# Patient Record
Sex: MALE | Race: WHITE | NOT HISPANIC OR LATINO | URBAN - METROPOLITAN AREA
[De-identification: names, ages, dates, MRNs, and addresses within clinical notes are randomized per-mention and may not be internally consistent; named-entity substitution may affect disease eponyms.]

---

## 2019-02-12 ENCOUNTER — HISTORICAL (OUTPATIENT)
Dept: LAB | Facility: HOSPITAL | Age: 29
End: 2019-02-12

## 2019-02-12 LAB
ABS NEUT (OLG): 6.49
ALBUMIN SERPL-MCNC: 4.3 GM/DL (ref 3.4–5)
ALBUMIN/GLOB SERPL: 1.1 RATIO (ref 1.1–2)
ALP SERPL-CCNC: 151 UNIT/L (ref 46–116)
ALT SERPL-CCNC: 52 UNIT/L (ref 12–78)
AMPHET UR QL SCN: NORMAL
AST SERPL-CCNC: 19 UNIT/L (ref 10–37)
BARBITURATE SCN PRESENT UR: NORMAL
BASOPHILS # BLD AUTO: 0.02 X10(3)/MCL
BASOPHILS NFR BLD AUTO: 0.2 %
BENZODIAZ UR QL SCN: NORMAL
BILIRUB SERPL-MCNC: 0.9 MG/DL (ref 0.2–1)
BILIRUBIN DIRECT+TOT PNL SERPL-MCNC: 0.18 MG/DL (ref 0–0.2)
BILIRUBIN DIRECT+TOT PNL SERPL-MCNC: 0.72 MG/DL
BUN SERPL-MCNC: 12 MG/DL (ref 7–18)
CALCIUM SERPL-MCNC: 9.5 MG/DL (ref 8.5–10.1)
CANNABINOIDS UR QL SCN: NORMAL
CHLORIDE SERPL-SCNC: 102 MMOL/L (ref 98–107)
CHOLEST SERPL-MCNC: 264 MG/DL (ref 50–200)
CHOLEST/HDLC SERPL: 7 {RATIO} (ref 0–5)
CO2 SERPL-SCNC: 30.5 MMOL/L (ref 21–32)
COCAINE UR QL SCN: NORMAL
CREAT SERPL-MCNC: 1.14 MG/DL (ref 0.7–1.3)
DEPRECATED CALCIDIOL+CALCIFEROL SERPL-MC: 24.05 NG/ML (ref 30–80)
EOSINOPHIL # BLD AUTO: 0.11 X10(3)/MCL
EOSINOPHIL NFR BLD AUTO: 1 %
ERYTHROCYTE [DISTWIDTH] IN BLOOD BY AUTOMATED COUNT: 13 %
EST. AVERAGE GLUCOSE BLD GHB EST-MCNC: 117 MG/DL
FT4I SERPL CALC-MCNC: 3 (ref 3–4)
GLOBULIN SER-MCNC: 3.8 GM/DL (ref 2.4–3.5)
GLUCOSE SERPL-MCNC: 99 MG/DL (ref 74–106)
HBA1C MFR BLD: 5.7 % (ref 4.5–6.2)
HCT VFR BLD AUTO: 49.9 % (ref 39–49)
HDLC SERPL-MCNC: 40 MG/DL (ref 35–60)
HGB BLD-MCNC: 16.8 GM/DL (ref 12.6–16.6)
IMM GRANULOCYTES # BLD AUTO: 0.03 10*3/UL (ref 0–0.1)
IMM GRANULOCYTES NFR BLD AUTO: 0.3 % (ref 0–1)
LDLC SERPL CALC-MCNC: 205 MG/DL (ref 50–140)
LYMPHOCYTES # BLD AUTO: 3.56 X10(3)/MCL
LYMPHOCYTES NFR BLD AUTO: 31.1 %
MCH RBC QN AUTO: 29 PG (ref 27–33)
MCHC RBC AUTO-ENTMCNC: 33.7 GM/DL (ref 32–35)
MCV RBC AUTO: 86 FL (ref 84–97)
METHADONE UR QL SCN: NORMAL
MONOCYTES # BLD AUTO: 1.24 X10(3)/MCL
MONOCYTES NFR BLD AUTO: 10.8 %
NEUTROPHILS # BLD AUTO: 6.49 X10(3)/MCL
NEUTROPHILS NFR BLD AUTO: 56.6 %
OPIATES UR QL SCN: NORMAL
PCP UR QL: NORMAL
PH UR STRIP.AUTO: 6 [PH] (ref 5–8)
PLATELET # BLD AUTO: 363 X10(3)/MCL (ref 151–368)
PMV BLD AUTO: 10 FL
POTASSIUM SERPL-SCNC: 3.9 MMOL/L (ref 3.5–5.1)
PROT SERPL-MCNC: 8.1 GM/DL (ref 6.4–8.2)
RBC # BLD AUTO: 5.8 X10(6)/MCL (ref 4.3–5.6)
SODIUM SERPL-SCNC: 141 MMOL/L (ref 136–145)
T3RU NFR SERPL: 37 % (ref 31–39)
T4 SERPL-MCNC: 8.8 MCG/DL (ref 4.7–13.3)
TRIGL SERPL-MCNC: 96 MG/DL (ref 30–150)
TSH SERPL-ACNC: 0.73 MIU/ML (ref 0.35–3.75)
VLDLC SERPL CALC-MCNC: 19 MG/DL
WBC # SPEC AUTO: 11.45 X10(3)/MCL (ref 3.4–9.2)

## 2024-01-07 ENCOUNTER — HOSPITAL ENCOUNTER (EMERGENCY)
Facility: HOSPITAL | Age: 34
Discharge: ANOTHER HOSPITAL | End: 2024-01-07
Attending: EMERGENCY MEDICINE
Payer: MEDICAID

## 2024-01-07 ENCOUNTER — HOSPITAL ENCOUNTER (INPATIENT)
Facility: HOSPITAL | Age: 34
LOS: 5 days | Discharge: HOME | DRG: 885 | End: 2024-01-12
Attending: PSYCHIATRY & NEUROLOGY | Admitting: PSYCHIATRY & NEUROLOGY
Payer: MEDICAID

## 2024-01-07 VITALS
SYSTOLIC BLOOD PRESSURE: 155 MMHG | TEMPERATURE: 98 F | BODY MASS INDEX: 28.23 KG/M2 | OXYGEN SATURATION: 100 % | HEART RATE: 85 BPM | HEIGHT: 74 IN | RESPIRATION RATE: 17 BRPM | DIASTOLIC BLOOD PRESSURE: 90 MMHG | WEIGHT: 220 LBS

## 2024-01-07 DIAGNOSIS — F29 PSYCHOSIS: ICD-10-CM

## 2024-01-07 DIAGNOSIS — Z00.8 MEDICAL CLEARANCE FOR PSYCHIATRIC ADMISSION: Primary | ICD-10-CM

## 2024-01-07 DIAGNOSIS — F23 ACUTE PSYCHOSIS: ICD-10-CM

## 2024-01-07 LAB
ALBUMIN SERPL-MCNC: 3.5 G/DL (ref 3.5–5)
ALBUMIN/GLOB SERPL: 1.1 RATIO (ref 1.1–2)
ALP SERPL-CCNC: 101 UNIT/L (ref 40–150)
ALT SERPL-CCNC: 15 UNIT/L (ref 0–55)
AMPHET UR QL SCN: POSITIVE
APAP SERPL-MCNC: <17.4 UG/ML (ref 17.4–30)
APPEARANCE UR: CLEAR
AST SERPL-CCNC: 15 UNIT/L (ref 5–34)
BARBITURATE SCN PRESENT UR: NEGATIVE
BASOPHILS # BLD AUTO: 0.05 X10(3)/MCL
BASOPHILS NFR BLD AUTO: 0.7 %
BENZODIAZ UR QL SCN: NEGATIVE
BILIRUB SERPL-MCNC: 0.2 MG/DL
BILIRUB UR QL STRIP.AUTO: NEGATIVE
BUN SERPL-MCNC: 15 MG/DL (ref 8.9–20.6)
CALCIUM SERPL-MCNC: 8.9 MG/DL (ref 8.4–10.2)
CANNABINOIDS UR QL SCN: POSITIVE
CHLORIDE SERPL-SCNC: 109 MMOL/L (ref 98–107)
CO2 SERPL-SCNC: 22 MMOL/L (ref 22–29)
COCAINE UR QL SCN: NEGATIVE
COLOR UR AUTO: YELLOW
CREAT SERPL-MCNC: 0.84 MG/DL (ref 0.73–1.18)
EOSINOPHIL # BLD AUTO: 0.19 X10(3)/MCL (ref 0–0.9)
EOSINOPHIL NFR BLD AUTO: 2.5 %
ERYTHROCYTE [DISTWIDTH] IN BLOOD BY AUTOMATED COUNT: 13.6 % (ref 11.5–17)
ETHANOL SERPL-MCNC: <10 MG/DL
FENTANYL UR QL SCN: NEGATIVE
GFR SERPLBLD CREATININE-BSD FMLA CKD-EPI: >60 MLS/MIN/1.73/M2
GLOBULIN SER-MCNC: 3.1 GM/DL (ref 2.4–3.5)
GLUCOSE SERPL-MCNC: 102 MG/DL (ref 74–100)
GLUCOSE UR QL STRIP.AUTO: NEGATIVE
HCT VFR BLD AUTO: 39.4 % (ref 42–52)
HGB BLD-MCNC: 12.7 G/DL (ref 14–18)
IMM GRANULOCYTES # BLD AUTO: 0.03 X10(3)/MCL (ref 0–0.04)
IMM GRANULOCYTES NFR BLD AUTO: 0.4 %
KETONES UR QL STRIP.AUTO: NEGATIVE
LEUKOCYTE ESTERASE UR QL STRIP.AUTO: NEGATIVE
LYMPHOCYTES # BLD AUTO: 2.54 X10(3)/MCL (ref 0.6–4.6)
LYMPHOCYTES NFR BLD AUTO: 33.9 %
MCH RBC QN AUTO: 28.7 PG (ref 27–31)
MCHC RBC AUTO-ENTMCNC: 32.2 G/DL (ref 33–36)
MCV RBC AUTO: 89.1 FL (ref 80–94)
MDMA UR QL SCN: NEGATIVE
MONOCYTES # BLD AUTO: 0.55 X10(3)/MCL (ref 0.1–1.3)
MONOCYTES NFR BLD AUTO: 7.3 %
NEUTROPHILS # BLD AUTO: 4.13 X10(3)/MCL (ref 2.1–9.2)
NEUTROPHILS NFR BLD AUTO: 55.2 %
NITRITE UR QL STRIP.AUTO: NEGATIVE
NRBC BLD AUTO-RTO: 0 %
OPIATES UR QL SCN: NEGATIVE
PCP UR QL: NEGATIVE
PH UR STRIP.AUTO: 7 [PH]
PH UR: 7 [PH] (ref 3–11)
PLATELET # BLD AUTO: 267 X10(3)/MCL (ref 130–400)
PMV BLD AUTO: 9.5 FL (ref 7.4–10.4)
POTASSIUM SERPL-SCNC: 4.1 MMOL/L (ref 3.5–5.1)
PROT SERPL-MCNC: 6.6 GM/DL (ref 6.4–8.3)
PROT UR QL STRIP.AUTO: NEGATIVE
RBC # BLD AUTO: 4.42 X10(6)/MCL (ref 4.7–6.1)
RBC UR QL AUTO: NEGATIVE
SARS-COV-2 RDRP RESP QL NAA+PROBE: NEGATIVE
SODIUM SERPL-SCNC: 140 MMOL/L (ref 136–145)
SP GR UR STRIP.AUTO: 1.01 (ref 1–1.03)
SPECIFIC GRAVITY, URINE AUTO (.000) (OHS): 1.01 (ref 1–1.03)
TSH SERPL-ACNC: 0.94 UIU/ML (ref 0.35–4.94)
UROBILINOGEN UR STRIP-ACNC: 0.2
WBC # SPEC AUTO: 7.49 X10(3)/MCL (ref 4.5–11.5)

## 2024-01-07 PROCEDURE — 85025 COMPLETE CBC W/AUTO DIFF WBC: CPT | Performed by: EMERGENCY MEDICINE

## 2024-01-07 PROCEDURE — 63600175 PHARM REV CODE 636 W HCPCS: Performed by: EMERGENCY MEDICINE

## 2024-01-07 PROCEDURE — 87635 SARS-COV-2 COVID-19 AMP PRB: CPT | Performed by: EMERGENCY MEDICINE

## 2024-01-07 PROCEDURE — 25000003 PHARM REV CODE 250

## 2024-01-07 PROCEDURE — 11400000 HC PSYCH PRIVATE ROOM

## 2024-01-07 PROCEDURE — 80053 COMPREHEN METABOLIC PANEL: CPT | Performed by: EMERGENCY MEDICINE

## 2024-01-07 PROCEDURE — 84443 ASSAY THYROID STIM HORMONE: CPT | Performed by: EMERGENCY MEDICINE

## 2024-01-07 PROCEDURE — 80307 DRUG TEST PRSMV CHEM ANLYZR: CPT | Performed by: EMERGENCY MEDICINE

## 2024-01-07 PROCEDURE — 82077 ASSAY SPEC XCP UR&BREATH IA: CPT | Performed by: EMERGENCY MEDICINE

## 2024-01-07 PROCEDURE — 81003 URINALYSIS AUTO W/O SCOPE: CPT | Performed by: EMERGENCY MEDICINE

## 2024-01-07 PROCEDURE — 99285 EMERGENCY DEPT VISIT HI MDM: CPT

## 2024-01-07 PROCEDURE — 96372 THER/PROPH/DIAG INJ SC/IM: CPT | Performed by: EMERGENCY MEDICINE

## 2024-01-07 PROCEDURE — A4216 STERILE WATER/SALINE, 10 ML: HCPCS

## 2024-01-07 PROCEDURE — 80143 DRUG ASSAY ACETAMINOPHEN: CPT | Performed by: EMERGENCY MEDICINE

## 2024-01-07 RX ORDER — ZIPRASIDONE MESYLATE 20 MG/ML
20 INJECTION, POWDER, LYOPHILIZED, FOR SOLUTION INTRAMUSCULAR
Status: COMPLETED | OUTPATIENT
Start: 2024-01-07 | End: 2024-01-07

## 2024-01-07 RX ORDER — HALOPERIDOL 5 MG/ML
10 INJECTION INTRAMUSCULAR EVERY 6 HOURS PRN
Status: DISCONTINUED | OUTPATIENT
Start: 2024-01-07 | End: 2024-01-12 | Stop reason: HOSPADM

## 2024-01-07 RX ORDER — DIPHENHYDRAMINE HYDROCHLORIDE 50 MG/ML
50 INJECTION, SOLUTION INTRAMUSCULAR; INTRAVENOUS EVERY 4 HOURS PRN
Status: DISCONTINUED | OUTPATIENT
Start: 2024-01-08 | End: 2024-01-12 | Stop reason: HOSPADM

## 2024-01-07 RX ORDER — TRAZODONE HYDROCHLORIDE 100 MG/1
100 TABLET ORAL NIGHTLY PRN
Status: DISCONTINUED | OUTPATIENT
Start: 2024-01-07 | End: 2024-01-12 | Stop reason: HOSPADM

## 2024-01-07 RX ORDER — MAG HYDROX/ALUMINUM HYD/SIMETH 200-200-20
30 SUSPENSION, ORAL (FINAL DOSE FORM) ORAL EVERY 6 HOURS PRN
Status: DISCONTINUED | OUTPATIENT
Start: 2024-01-08 | End: 2024-01-12 | Stop reason: HOSPADM

## 2024-01-07 RX ORDER — ACETAMINOPHEN 325 MG/1
650 TABLET ORAL EVERY 6 HOURS PRN
Status: DISCONTINUED | OUTPATIENT
Start: 2024-01-08 | End: 2024-01-07

## 2024-01-07 RX ORDER — ADHESIVE BANDAGE
30 BANDAGE TOPICAL DAILY PRN
Status: DISCONTINUED | OUTPATIENT
Start: 2024-01-07 | End: 2024-01-12 | Stop reason: HOSPADM

## 2024-01-07 RX ORDER — HALOPERIDOL 5 MG/1
10 TABLET ORAL EVERY 4 HOURS PRN
Status: DISCONTINUED | OUTPATIENT
Start: 2024-01-08 | End: 2024-01-12 | Stop reason: HOSPADM

## 2024-01-07 RX ORDER — DIPHENHYDRAMINE HYDROCHLORIDE 50 MG/ML
50 INJECTION, SOLUTION INTRAMUSCULAR; INTRAVENOUS EVERY 6 HOURS PRN
Status: DISCONTINUED | OUTPATIENT
Start: 2024-01-07 | End: 2024-01-08

## 2024-01-07 RX ORDER — LORAZEPAM 2 MG/ML
2 INJECTION INTRAMUSCULAR EVERY 4 HOURS PRN
Status: DISCONTINUED | OUTPATIENT
Start: 2024-01-08 | End: 2024-01-08

## 2024-01-07 RX ORDER — LORAZEPAM 2 MG/ML
2 INJECTION INTRAMUSCULAR EVERY 6 HOURS PRN
Status: DISCONTINUED | OUTPATIENT
Start: 2024-01-07 | End: 2024-01-12 | Stop reason: HOSPADM

## 2024-01-07 RX ORDER — WATER FOR INJECTION,STERILE
VIAL (ML) INJECTION
Status: COMPLETED
Start: 2024-01-07 | End: 2024-01-07

## 2024-01-07 RX ORDER — HYDROXYZINE HYDROCHLORIDE 50 MG/1
50 TABLET, FILM COATED ORAL EVERY 6 HOURS PRN
Status: DISCONTINUED | OUTPATIENT
Start: 2024-01-07 | End: 2024-01-12 | Stop reason: HOSPADM

## 2024-01-07 RX ORDER — HALOPERIDOL 5 MG/1
10 TABLET ORAL EVERY 4 HOURS PRN
Status: DISCONTINUED | OUTPATIENT
Start: 2024-01-07 | End: 2024-01-12 | Stop reason: HOSPADM

## 2024-01-07 RX ORDER — HALOPERIDOL 5 MG/ML
10 INJECTION INTRAMUSCULAR EVERY 4 HOURS PRN
Status: DISCONTINUED | OUTPATIENT
Start: 2024-01-08 | End: 2024-01-12 | Stop reason: HOSPADM

## 2024-01-07 RX ORDER — IBUPROFEN 200 MG
1 TABLET ORAL DAILY
Status: DISCONTINUED | OUTPATIENT
Start: 2024-01-08 | End: 2024-01-10

## 2024-01-07 RX ORDER — LORAZEPAM 1 MG/1
2 TABLET ORAL EVERY 4 HOURS PRN
Status: DISCONTINUED | OUTPATIENT
Start: 2024-01-08 | End: 2024-01-12 | Stop reason: HOSPADM

## 2024-01-07 RX ORDER — DIPHENHYDRAMINE HCL 50 MG
50 CAPSULE ORAL EVERY 6 HOURS PRN
Status: DISCONTINUED | OUTPATIENT
Start: 2024-01-07 | End: 2024-01-08

## 2024-01-07 RX ORDER — DIPHENHYDRAMINE HCL 50 MG
50 CAPSULE ORAL EVERY 4 HOURS PRN
Status: DISCONTINUED | OUTPATIENT
Start: 2024-01-08 | End: 2024-01-07

## 2024-01-07 RX ORDER — ACETAMINOPHEN 325 MG/1
650 TABLET ORAL EVERY 6 HOURS PRN
Status: DISCONTINUED | OUTPATIENT
Start: 2024-01-07 | End: 2024-01-12 | Stop reason: HOSPADM

## 2024-01-07 RX ADMIN — ZIPRASIDONE MESYLATE 20 MG: 20 INJECTION, POWDER, LYOPHILIZED, FOR SOLUTION INTRAMUSCULAR at 05:01

## 2024-01-07 RX ADMIN — WATER 1.2 ML: 1 INJECTION INTRAMUSCULAR; INTRAVENOUS; SUBCUTANEOUS at 05:01
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
## 2024-01-08 PROBLEM — F12.20 CANNABIS DEPENDENCE, CONTINUOUS: Status: ACTIVE | Noted: 2024-01-08

## 2024-01-08 PROBLEM — F29 PSYCHOTIC DISORDER: Status: ACTIVE | Noted: 2024-01-08

## 2024-01-08 PROBLEM — F15.20 METHAMPHETAMINE ADDICTION: Status: ACTIVE | Noted: 2024-01-08

## 2024-01-08 PROCEDURE — 11400000 HC PSYCH PRIVATE ROOM

## 2024-01-08 RX ORDER — DIPHENHYDRAMINE HCL 50 MG
50 CAPSULE ORAL EVERY 4 HOURS PRN
Status: DISCONTINUED | OUTPATIENT
Start: 2024-01-08 | End: 2024-01-12 | Stop reason: HOSPADM

## 2024-01-08 NOTE — PLAN OF CARE
Problem: Adult Inpatient Plan of Care  Goal: Plan of Care Review  Outcome: Ongoing, Not Progressing  Goal: Patient-Specific Goal (Individualized)  Outcome: Ongoing, Not Progressing  Goal: Absence of Hospital-Acquired Illness or Injury  Outcome: Ongoing, Not Progressing  Goal: Optimal Comfort and Wellbeing  Outcome: Ongoing, Not Progressing  Goal: Readiness for Transition of Care  Outcome: Ongoing, Not Progressing     Problem: Violence Risk or Actual  Goal: Anger and Impulse Control  Outcome: Ongoing, Not Progressing     Problem: Behavior Regulation Impairment (Psychotic Signs/Symptoms)  Goal: Improved Behavioral Control (Psychotic Signs/Symptoms)  Outcome: Ongoing, Not Progressing     Problem: Cognitive Impairment (Psychotic Signs/Symptoms)  Goal: Optimal Cognitive Function (Psychotic Signs/Symptoms)  Outcome: Ongoing, Not Progressing     Problem: Decreased Participation and Engagement (Psychotic Signs/Symptoms)  Goal: Increased Participation and Engagement (Psychotic Signs/Symptoms)  Outcome: Ongoing, Not Progressing     Problem: Mood Impairment (Psychotic Signs/Symptoms)  Goal: Improved Mood Symptoms (Psychotic Signs/Symptoms)  Outcome: Ongoing, Not Progressing     Problem: Psychomotor Impairment (Psychotic Signs/Symptoms)  Goal: Improved Psychomotor Symptoms (Psychotic Signs/Symptoms)  Outcome: Ongoing, Not Progressing     Problem: Sensory Perception Impairment (Psychotic Signs/Symptoms)  Goal: Decreased Sensory Symptoms (Psychotic Signs/Symptoms)  Outcome: Ongoing, Not Progressing     Problem: Sleep Disturbance (Psychotic Signs/Symptoms)  Goal: Improved Sleep (Psychotic Signs/Symptoms)  Outcome: Ongoing, Not Progressing     Problem: Social, Occupational or Functional Impairment (Psychotic Signs/Symptoms)  Goal: Enhanced Social, Occupational or Functional Skills (Psychotic Signs/Symptoms)  Outcome: Ongoing, Not Progressing

## 2024-01-08 NOTE — PROGRESS NOTES
Neto refused both TR groups despite encouragement; alternative material was offered and refused   01/08/24 1500   [facility] Group Therapy   Group Name Therapeutic Recreation   Specific Interventions Skilled Activity Creative Expression   Participation Level None   Participation Quality Refused

## 2024-01-08 NOTE — PROGRESS NOTES
"Neto is a 33 male admitted for Unspecified Psychotic Disorder, Amphetamine use disorder, and Cannabis use disorder with a uds +amp and cannabis. CTRS met with Pt 1:1 at bedside, Neto was agitated, appearing paranoid, and somewhat cooperative, reporting ability to perform his ADL's despite unkept and disheveled appearance. CTRS educated Pt to TR group times and dates with Neto refusing to attend, CTRS encouraged Neto to attend groups as this is part of his treatment; CTRS will prompt Pt prior to TR groups. Neto reported his treatment goal as "I just want to got out of here and sleep".     01/08/24 0821   General   Admit Date 01/07/24   Primary Diagnosis Unspecified Psychotic Disorder   Secondary Diagnosis Amphetamine use disorder, and Cannabis use disorder   Confucianism none   Number of Children 0   Children Living? 0   Occupation unemployed   Does the patient have dentures? No   If you were to take part in activities, which of the following would you prefer? Activities that you do alone   Do you feel like you have enough to keep you busy now? Yes   Do you believe that you have the opportunity for physical activity? Yes   Activity Capabilities Minimum   Subjective   Patient states I wanted a head scan   Assessment   Mobility ambulates independently   Transfers independently   Musculoskeletal   (none)   Visual Acuity normal vision   Visual Perception depth perception;color perception;recognizes letters;recognizes numbers   Hearing normal   Speech/Communication normal   Cognitive Concerns disoriented to;situation;problem solving;concentration;abstract thinking   Emotional Concerns appears agitated;appears homesick;appears isolated;anger;critical of self or others   Leisure Interest Survey   Leisure Interest Survey No  (I just chill)   Goals   Additional Documentation yes   Goal Formulation With patient   Time For Goal Achievement 7 days   Goal 1 I just want to get out of here and sleep   Plan   Planned Therapy " Intervention Group Recreational Therapy   Expected Length of Stay 5-7days   PT Frequency Minimum of 3 visits per week
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
## 2024-01-08 NOTE — NURSING
"Admission Note:    Neto Ellison is a 33 y.o. male, : 1990, MRN: [id number], admitted on 2024 to [facility] ([facility]) for Cj Cintron MD with a diagnosis of Psychosis [F29]. Patient admitted on a status of Physician Emergency Certificate (PEC). Neto reports no known food or drug allergies.    Patient demonstrated an affect that was flat and anxious. Patient demonstrated mood during assessment that was anxious. Patient had an appearance that was disheveled.  Patient denies suicidal ideation. Patient denies suicide plan. Patient denies hallucinations.    Neto's  height is 6' 2" (1.88 m) and weight is 79.7 kg (175 lb 12.8 oz). His temperature is 98.2 °F (36.8 °C). His blood pressure is 128/83 and his pulse is 65. His respiration is 18.     Neto's last BM was noted on: _______    Metal detector screening performed via security personnel. The result of the scan was _______. Head-to-toe physical assessment completed with the following findings:  ________ found upon body screen. A full skin assessment was performed. Elijahs skin appeared _______.  Neto was oriented to unit, staff, peers, and room. Patient belongings/valuables stored in locked intake room cabinet and changes of clothing provided to patient. Neto was placed on Q 15 min observations.      "

## 2024-01-08 NOTE — NURSING
"Pt admitted last night on a PEC, currently voicing no ADRs or physical complaints at this time, vital signs are stable, pt is not in any physical distress at the current time, pt was sent to ER after calling the [employer] stating that a psychiatrist put an "implant" behind one of his eyes in order to read his thoughts, when he arrived at ER, he got very agitated and verbally aggressive and was given  Geodon, currently voicing no suicidal or homicidal ideations at this time, pt continues with paranoia, anxiety, was seen by Dr Cintron this am, was not started on medication as he currently does not want to be on medication, he is currently isolating in his room, his UDS is postive for thc and and amphetamine, he has a long psych hx and frequently noncompliant with medication, currently voicing no detox symptoms at this time,  Will monitor with suicide prec. For anger, psychosis and detox symptoms and will be assisted prn.  "
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
## 2024-01-08 NOTE — H&P
[facility] General - Behavioral Health Unit  History & Physical    Subjective:      Chief Complaint/Reason for Admission: delusional disorder     Neto Ellison is a 33 y.o. male. Patient of Dr. Rose admitted from ER with delusions and faustino    No past medical history on file.  No past surgical history on file.  No family history on file.       No medications prior to admission.     Review of patient's allergies indicates:  No Known Allergies     Review of Systems   Constitutional: Negative.    HENT: Negative.     Eyes: Negative.    Respiratory: Negative.     Cardiovascular: Negative.    Gastrointestinal: Negative.    Genitourinary: Negative.    Musculoskeletal: Negative.    Skin: Negative.    Neurological: Negative.    Endo/Heme/Allergies: Negative.    Psychiatric/Behavioral:  Positive for hallucinations. Negative for depression, substance abuse and suicidal ideas.        Objective:      Vital Signs (Most Recent)  Temp: 98.2 °F (36.8 °C) (01/08/24 0701)  Pulse: 76 (01/08/24 0701)  Resp: 18 (01/08/24 0701)  BP: 124/80 (01/08/24 0701)  SpO2: 98 % (01/08/24 0701)    Vital Signs Range (Last 24H):  Temp:  [98.2 °F (36.8 °C)-98.4 °F (36.9 °C)]   Pulse:  [65-99]   Resp:  [17-18]   BP: (124-172)/()   SpO2:  [98 %-100 %]     Physical Exam  HENT:      Head: Normocephalic.      Right Ear: Tympanic membrane normal.      Left Ear: Tympanic membrane normal.      Nose: Nose normal.      Mouth/Throat:      Mouth: Mucous membranes are moist.   Eyes:      Extraocular Movements: Extraocular movements intact.      Pupils: Pupils are equal, round, and reactive to light.   Cardiovascular:      Rate and Rhythm: Normal rate and regular rhythm.   Pulmonary:      Effort: Pulmonary effort is normal.   Abdominal:      General: Abdomen is flat.   Musculoskeletal:         General: Normal range of motion.   Skin:     General: Skin is warm.   Neurological:      General: No focal deficit present.      Mental Status: He is alert and  oriented to person, place, and time.      Comments: Vision normal   Hearing normal   EOM intact   Face muscles normal  Facial sensation normal   Shrugs shoulders  Tongue midline            Data Review:    Recent Results (from the past 48 hour(s))   Urinalysis, Reflex to Urine Culture    Collection Time: 01/07/24  6:00 PM    Specimen: Urine   Result Value Ref Range    Color, UA Yellow Yellow, Light-Yellow, Dark Yellow, Minnie, Straw    Appearance, UA Clear Clear    Specific Gravity, UA 1.015 1.005 - 1.030    pH, UA 7.0 5.0 - 8.5    Protein, UA Negative Negative    Glucose, UA Negative Negative, Normal    Ketones, UA Negative Negative    Blood, UA Negative Negative    Bilirubin, UA Negative Negative    Urobilinogen, UA 0.2 0.2, 1.0, Normal    Nitrites, UA Negative Negative    Leukocyte Esterase, UA Negative Negative   Drug Screen, Urine    Collection Time: 01/07/24  6:00 PM   Result Value Ref Range    Amphetamines, Urine Positive (A) Negative    Barbituates, Urine Negative Negative    Benzodiazepine, Urine Negative Negative    Cannabinoids, Urine Positive (A) Negative    Cocaine, Urine Negative Negative    Fentanyl, Urine Negative Negative    MDMA, Urine Negative Negative    Opiates, Urine Negative Negative    Phencyclidine, Urine Negative Negative    pH, Urine 7.0 3.0 - 11.0    Specific Gravity, Urine Auto 1.015 1.001 - 1.035   COVID-19 Rapid Screening    Collection Time: 01/07/24  6:35 PM   Result Value Ref Range    SARS COV-2 MOLECULAR Negative Negative   Comprehensive metabolic panel    Collection Time: 01/07/24  6:36 PM   Result Value Ref Range    Sodium Level 140 136 - 145 mmol/L    Potassium Level 4.1 3.5 - 5.1 mmol/L    Chloride 109 (H) 98 - 107 mmol/L    Carbon Dioxide 22 22 - 29 mmol/L    Glucose Level 102 (H) 74 - 100 mg/dL    Blood Urea Nitrogen 15.0 8.9 - 20.6 mg/dL    Creatinine 0.84 0.73 - 1.18 mg/dL    Calcium Level Total 8.9 8.4 - 10.2 mg/dL    Protein Total 6.6 6.4 - 8.3 gm/dL    Albumin Level 3.5 3.5  - 5.0 g/dL    Globulin 3.1 2.4 - 3.5 gm/dL    Albumin/Globulin Ratio 1.1 1.1 - 2.0 ratio    Bilirubin Total 0.2 <=1.5 mg/dL    Alkaline Phosphatase 101 40 - 150 unit/L    Alanine Aminotransferase 15 0 - 55 unit/L    Aspartate Aminotransferase 15 5 - 34 unit/L    eGFR >60 mls/min/1.73/m2   TSH    Collection Time: 01/07/24  6:36 PM   Result Value Ref Range    TSH 0.942 0.350 - 4.940 uIU/mL   Ethanol    Collection Time: 01/07/24  6:36 PM   Result Value Ref Range    Ethanol Level <10.0 <=10.0 mg/dL   Acetaminophen level    Collection Time: 01/07/24  6:36 PM   Result Value Ref Range    Acetaminophen Level <17.4 (L) 17.4 - 30.0 ug/ml   CBC with Differential    Collection Time: 01/07/24  6:36 PM   Result Value Ref Range    WBC 7.49 4.50 - 11.50 x10(3)/mcL    RBC 4.42 (L) 4.70 - 6.10 x10(6)/mcL    Hgb 12.7 (L) 14.0 - 18.0 g/dL    Hct 39.4 (L) 42.0 - 52.0 %    MCV 89.1 80.0 - 94.0 fL    MCH 28.7 27.0 - 31.0 pg    MCHC 32.2 (L) 33.0 - 36.0 g/dL    RDW 13.6 11.5 - 17.0 %    Platelet 267 130 - 400 x10(3)/mcL    MPV 9.5 7.4 - 10.4 fL    Neut % 55.2 %    Lymph % 33.9 %    Mono % 7.3 %    Eos % 2.5 %    Basophil % 0.7 %    Lymph # 2.54 0.6 - 4.6 x10(3)/mcL    Neut # 4.13 2.1 - 9.2 x10(3)/mcL    Mono # 0.55 0.1 - 1.3 x10(3)/mcL    Eos # 0.19 0 - 0.9 x10(3)/mcL    Baso # 0.05 <=0.2 x10(3)/mcL    IG# 0.03 0 - 0.04 x10(3)/mcL    IG% 0.4 %    NRBC% 0.0 %        No results found.       Assessment and Plan       Methamphetamine abuse   Delusions
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
## 2024-01-08 NOTE — ED NOTES
"Pt verbally aggressive with staff. Pt states he is going to opal staff and states "yall cannot keep me here." Pt raising voice with staff.  "

## 2024-01-09 PROCEDURE — 11400000 HC PSYCH PRIVATE ROOM

## 2024-01-09 PROCEDURE — 25000003 PHARM REV CODE 250

## 2024-01-09 PROCEDURE — 25000003 PHARM REV CODE 250: Performed by: PSYCHIATRY & NEUROLOGY

## 2024-01-09 RX ORDER — OLANZAPINE 5 MG/1
10 TABLET, ORALLY DISINTEGRATING ORAL NIGHTLY
Status: DISCONTINUED | OUTPATIENT
Start: 2024-01-09 | End: 2024-01-12 | Stop reason: HOSPADM

## 2024-01-09 RX ADMIN — OLANZAPINE 10 MG: 5 TABLET, ORALLY DISINTEGRATING ORAL at 08:01

## 2024-01-09 RX ADMIN — HYDROXYZINE HYDROCHLORIDE 50 MG: 50 TABLET, FILM COATED ORAL at 02:01

## 2024-01-09 NOTE — GROUP NOTE
Group Psychotherapy       Group Focus: Life Skills and Discharge Planning      Number of patients in attendance: 6    Group Start Time: 1045  Group End Time:  1130  Groups Date: 1/9/2024  Group Topic:  Behavioral Health  Group Department: [facility] Behavioral Health Unit  Group Facilitators:  Minnie Suarez SSW   _____________________________________________________________________    Patient Name: Neto Ellison  MRN: [id number]  Patient Class: IP- Psych   Admission Date\Time: 1/7/2024 11:41 PM  Hospital Length of Stay: 2  Primary Care Provider: Sheridan Zelaya MD     Referred by: Acute Psychiatry Unit Treatment Team     Target symptoms: Psychosis     Patient's response to treatment: Argumentative and Disruptive     Progress toward goals: Not progressing     Interval History:      Diagnosis:      Plan: Continue treatment on [facility]

## 2024-01-09 NOTE — NURSING
Pt is currently voicing no ADRs or physical complaints at this time, vital signs are stable,   Pt is not in any physical distress at the current time, currently voicing no suicidal or homicidal ideations at this time, pt does appear anxious at times, isolates frequently, pt was seen earlier today by RADHA iWse, he continues to make paranoid and delusional statements, continues  To think that something was implanted into his body like in his ear or behind his eye so that he can be monitored, voicing no detox symptoms at this time, Zyprexa was ordered qhs, will monitor with suicide prec., for anger, psychosis and detox symptoms and will be assisted prn.

## 2024-01-09 NOTE — NURSING
"Daily Nursing Note:      Behavior:    Patient (Neto Ellisno is a 33 y.o. male, : 1990, MRN: [id number]) demonstrating an affect that was flat and anxious. Neto demonstrating mood that is anxious. Neto had an appearance that was disheveled. Neto denies suicidal ideation. Neto denies suicide plan. Neto denies homicidal ideation. Neto denies hallucinations.    Neto's  height is 6' 2" (1.88 m) and weight is 79.7 kg (175 lb 12.8 oz). His oral temperature is 98.2 °F (36.8 °C). His blood pressure is 124/80 and his pulse is 76. His respiration is 18 and oxygen saturation is 98%.     Neto's last BM was noted on: _______      Intervention:    Encourage Neto to perform self-hygiene, grooming, and changing of clothing. Monitor Neto's behavior and program compliance. Monitor Neto for suicidal ideation, homicidal ideation, sleep disturbance, and hallucinations. Encourage Neto to eat all portions of meals and assess for meal preferences. Monitor Neto for intake and output to ensure hydration. Notify the Physician/Physician Assistant/Advance Practice Registered Nurse (MD/PA/APRN) for any medication refusal and any change in patient condition.      Response:    Neto verbalizes understand of unit process and procedures. Neto reported medications ______.      Plan:     Continue to monitor per MD/PA/APRN orders; maintain patient safety.   "

## 2024-01-09 NOTE — PROGRESS NOTES
"1/9/2024  Neto Ellison   1990   [id number]        Psychiatry Progress Note     Chief Complaint: Im trying to get out of here    SUBJECTIVE:   Neto Ellison is a 33 y.o. male placed under a PEC at [facility] for paranoid delusions that Dr. Rose implanted a listening device in his head.  He apparently called the FBI who then called the local 's office about this.     Patient continues to show severe signs of paranoia and delusions. He continues to think that there is nothing wrong with him. He stated that every time he comes to a place like this the doctors do not help him, however when I asked what I could do specifically to help him he stated that he did not want my help. He continues to believe that there is some sort of bug implanted in his ear and that he is being controlled like a puppet by "they". He was unable to tell me who "they" are. When asked why they chose him to do this to he stated that it was a bet. He continues to refuse medication. Staff report that he has not had any acute behavioral issues so I will not issue a forced protocol at this time. However patient would benefit from an antipsychotic so I will start Zyprexa Zydis 10 mg HS.         Current Medications:   Scheduled Meds:    nicotine  1 patch Transdermal Daily      PRN Meds: acetaminophen, aluminum-magnesium hydroxide-simethicone, diphenhydrAMINE, haloperidoL **AND** [DISCONTINUED] diphenhydrAMINE **AND** LORazepam **AND** haloperidol lactate **AND** diphenhydrAMINE **AND** [DISCONTINUED] lorazepam, haloperidol lactate, haloperidoL, hydrOXYzine HCL, lorazepam, magnesium hydroxide 400 mg/5 ml, traZODone   Psychotherapeutics (From admission, onward)      Start     Stop Route Frequency Ordered    01/08/24 0032  LORazepam tablet 2 mg  (Med - Acute  Behavioral Management)        See Hyperspanathanael for full Linked Orders Report.    -- Oral Every 4 hours PRN 01/07/24 2348    01/08/24 0032  haloperidol lactate injection 10 mg  (Med - Acute  " Behavioral Management)        See Hyperspace for full Linked Orders Report.    -- IM Every 4 hours PRN 01/07/24 2348    01/08/24 0032  haloperidoL tablet 10 mg  (Med - Acute  Behavioral Management)        See Hyperspace for full Linked Orders Report.    -- Oral Every 4 hours PRN 01/07/24 2348    01/07/24 2347  haloperidol lactate injection 10 mg         -- IM Every 6 hours PRN 01/07/24 2348    01/07/24 2347  haloperidoL tablet 10 mg         -- Oral Every 4 hours PRN 01/07/24 2348    01/07/24 2347  LORazepam injection 2 mg         -- IM Every 6 hours PRN 01/07/24 2348    01/07/24 2347  traZODone tablet 100 mg         -- Oral Nightly PRN 01/07/24 2348            Allergies:   Review of patient's allergies indicates:  No Known Allergies     OBJECTIVE:   Vitals   Vitals:    01/08/24 0701   BP: 124/80   Pulse: 76   Resp: 18   Temp: 98.2 °F (36.8 °C)        Labs/Imaging/Studies:   No results found for this or any previous visit (from the past 36 hour(s)).       Medical Review Of Systems:  A comprehensive review of systems was negative.      Psychiatric Mental Status Exam:  General Appearance: appears stated age, disheveled  Arousal: alert  Behavior: somewhat cooperative  Movements and Motor Activity: no abnormal involuntary movements noted  Orientation: oriented to person, place, time, and situation  Speech: normal rate, normal rhythm, normal volume, normal tone, frequent throat clearing  Mood: Anxious  Affect: Anxious  Thought Process: linear  Associations: intact  Thought Content and Perceptions: (+)recent paranoid delusions, denies suicidal ideation, no homicidal ideation  Recent and Remote Memory: recent memory intact, remote memory intact; per interview/observation with patient  Attention and Concentration: intact, attentive to conversation; per interview/observation with patient  Fund of Knowledge: intact, aware of current events, vocabulary appropriate; based on history, vocabulary, fund of knowledge, syntax,  grammar, and content  Insight: questionable; based on understanding of severity of illness and HPI  Judgment: questionable; based on patient's behavior and HPI    ASSESSMENT/PLAN:   Problems Addressed/Diagnoses:  Unspecified Psychotic Disorder (F29)  Amphetamine use disorder (F15.20)  Cannabis use disorder (F12.20)    No past medical history on file.     Plan:  Psychosis  -Zyprexa Zydis 10 mg HS    Expected Disposition Plan: Home        Rahul Franco PMHNP-BC

## 2024-01-09 NOTE — PLAN OF CARE
Problem: Adult Inpatient Plan of Care  Goal: Plan of Care Review  Outcome: Ongoing, Progressing  Goal: Patient-Specific Goal (Individualized)  Outcome: Ongoing, Progressing  Goal: Absence of Hospital-Acquired Illness or Injury  Outcome: Ongoing, Progressing  Goal: Optimal Comfort and Wellbeing  Outcome: Ongoing, Progressing  Goal: Readiness for Transition of Care  Outcome: Ongoing, Progressing     Problem: Violence Risk or Actual  Goal: Anger and Impulse Control  Outcome: Ongoing, Progressing     Problem: Behavior Regulation Impairment (Psychotic Signs/Symptoms)  Goal: Improved Behavioral Control (Psychotic Signs/Symptoms)  Outcome: Ongoing, Progressing     Problem: Cognitive Impairment (Psychotic Signs/Symptoms)  Goal: Optimal Cognitive Function (Psychotic Signs/Symptoms)  Outcome: Ongoing, Progressing     Problem: Decreased Participation and Engagement (Psychotic Signs/Symptoms)  Goal: Increased Participation and Engagement (Psychotic Signs/Symptoms)  Outcome: Ongoing, Progressing     Problem: Mood Impairment (Psychotic Signs/Symptoms)  Goal: Improved Mood Symptoms (Psychotic Signs/Symptoms)  Outcome: Ongoing, Progressing     Problem: Psychomotor Impairment (Psychotic Signs/Symptoms)  Goal: Improved Psychomotor Symptoms (Psychotic Signs/Symptoms)  Outcome: Ongoing, Progressing     Problem: Sensory Perception Impairment (Psychotic Signs/Symptoms)  Goal: Decreased Sensory Symptoms (Psychotic Signs/Symptoms)  Outcome: Ongoing, Progressing     Problem: Sleep Disturbance (Psychotic Signs/Symptoms)  Goal: Improved Sleep (Psychotic Signs/Symptoms)  Outcome: Ongoing, Progressing     Problem: Social, Occupational or Functional Impairment (Psychotic Signs/Symptoms)  Goal: Enhanced Social, Occupational or Functional Skills (Psychotic Signs/Symptoms)  Outcome: Ongoing, Progressing

## 2024-01-10 PROCEDURE — 11400000 HC PSYCH PRIVATE ROOM

## 2024-01-10 NOTE — PROGRESS NOTES
"1/10/2024  Neto Ellison   1990   [id number]        Psychiatry Progress Note     Chief Complaint: "All right"    SUBJECTIVE:   Neto Ellison is a 33 y.o. male placed under a PEC at [facility] for paranoid delusions that Dr. Rose implanted a listening device in his head.  He apparently called the FBI who then called the local 's office about this.    Patient did take Zyprexa last night.  Staff reports that he had generally been irritable and refusing care.  However, he is calmer this morning.  Just arising from sleep.  Will continue this medication and encourage compliance.      UDS: (+)amphetamine, cannabis  Blood alcohol: <10    Current Medications:   Scheduled Meds:    nicotine  1 patch Transdermal Daily    OLANZapine zydis  10 mg Oral QHS      PRN Meds: acetaminophen, aluminum-magnesium hydroxide-simethicone, diphenhydrAMINE, haloperidoL **AND** [DISCONTINUED] diphenhydrAMINE **AND** LORazepam **AND** haloperidol lactate **AND** diphenhydrAMINE **AND** [DISCONTINUED] lorazepam, haloperidol lactate, haloperidoL, hydrOXYzine HCL, lorazepam, magnesium hydroxide 400 mg/5 ml, traZODone   Psychotherapeutics (From admission, onward)      Start     Stop Route Frequency Ordered    01/09/24 2100  OLANZapine zydis disintegrating tablet 10 mg         -- Oral Nightly 01/09/24 0914    01/08/24 0032  LORazepam tablet 2 mg  (Med - Acute  Behavioral Management)        See Hyperspace for full Linked Orders Report.    -- Oral Every 4 hours PRN 01/07/24 2348    01/08/24 0032  haloperidol lactate injection 10 mg  (Med - Acute  Behavioral Management)        See Hyperspace for full Linked Orders Report.    -- IM Every 4 hours PRN 01/07/24 2348    01/08/24 0032  haloperidoL tablet 10 mg  (Med - Acute  Behavioral Management)        See Hyperspace for full Linked Orders Report.    -- Oral Every 4 hours PRN 01/07/24 2348    01/07/24 2347  haloperidol lactate injection 10 mg         -- IM Every 6 hours PRN 01/07/24 2348    " "01/07/24 2347  haloperidoL tablet 10 mg         -- Oral Every 4 hours PRN 01/07/24 2348    01/07/24 2347  LORazepam injection 2 mg         -- IM Every 6 hours PRN 01/07/24 2348    01/07/24 2347  traZODone tablet 100 mg         -- Oral Nightly PRN 01/07/24 2348            Allergies:   Review of patient's allergies indicates:  No Known Allergies     OBJECTIVE:   Vitals   Vitals:    01/09/24 1901   BP: (!) 149/96   Pulse: 105   Resp: 20   Temp: 98.4 °F (36.9 °C)        Labs/Imaging/Studies:   No results found for this or any previous visit (from the past 36 hour(s)).       Medical Review Of Systems:  Constitutional: negative  Respiratory: negative  Cardiovascular: negative  Gastrointestinal: negative  Genitourinary:negative  Musculoskeletal:negative  Neurological: negative       Psychiatric Mental Status Exam:  General Appearance: appears stated age, well-developed, well-nourished  Arousal: alert  Behavior: cooperative, less irritable  Movements and Motor Activity: no abnormal involuntary movements noted  Orientation: oriented to person, place, time, and situation  Speech: normal rate, normal rhythm, normal volume, normal tone  Mood: "All right"  Affect: constricted  Thought Process: linear  Associations: intact  Thought Content and Perceptions: less paranoid today, no suicidal ideation, no homicidal ideation  Recent and Remote Memory: recent memory intact, remote memory intact; per interview/observation with patient  Attention and Concentration: intact, attentive to conversation; per interview/observation with patient  Fund of Knowledge: intact, aware of current events, vocabulary appropriate; based on history, vocabulary, fund of knowledge, syntax, grammar, and content  Insight: questionable; based on understanding of severity of illness and HPI  Judgment: questionable; based on patient's behavior and HPI      ASSESSMENT/PLAN:   Problems Addressed/Diagnoses:  Unspecified Psychotic Disorder (F29)  Amphetamine use " disorder (F15.20)  Cannabis use disorder (F12.20)    No past medical history on file.     Plan:  Psychosis, acute  -Continue Zyprexa zydis     Amphetamine use, chronic with acute exacerbation  -Group/Individual psychotherapy     Cannabis use, chronic with acute exacerbation  -Group/Individual psychotherapy    Expected Disposition Plan: Home        Cj Cintron M.D.

## 2024-01-10 NOTE — PLAN OF CARE
Neto refused to attend TR groups despite encouragement, leaves room only for food and snacks, does not interact with peers, minimally with staff, does not attend his ADL's, is unkempt and disheveled.    Neto attended treatment team, was cooperative, staying to topic, and slowly progressing on his treatment goals.

## 2024-01-10 NOTE — NURSING
"Daily Nursing Note:      Behavior:    Patient (Neto Ellison is a 33 y.o. male, : 1990, MRN: [id number]) demonstrating an affect that was sad. Neto demonstrating mood that is angry. Neto had an appearance that was disheveled. Neto denies suicidal ideation. Neto denies suicide plan. Neto denies homicidal ideation. Neto denies hallucinations.    Neto's  height is 6' 2" (1.88 m) and weight is 79.7 kg (175 lb 12.8 oz). His oral temperature is 98.4 °F (36.9 °C). His blood pressure is 149/96 (abnormal) and his pulse is 105. His respiration is 20 and oxygen saturation is 99%.     Neto's last BM was noted on: _24______      Intervention:    Encourage Neto to perform self-hygiene, grooming, and changing of clothing. Monitor Neto's behavior and program compliance. Monitor Neto for suicidal ideation, homicidal ideation, sleep disturbance, and hallucinations. Encourage Neto to eat all portions of meals and assess for meal preferences. Monitor Neto for intake and output to ensure hydration. Notify the Physician/Physician Assistant/Advance Practice Registered Nurse (MD/PA/APRN) for any medication refusal and any change in patient condition.      Response:      Plan:     Continue to monitor per MD/PA/APRN orders; maintain patient safety.   " Erythromycin Counseling:  I discussed with the patient the risks of erythromycin including but not limited to GI upset, allergic reaction, drug rash, diarrhea, increase in liver enzymes, and yeast infections.

## 2024-01-10 NOTE — NURSING
"Daily Nursing Note:      Behavior:    Patient (Neto Ellison is a 33 y.o. male, : 1990, MRN: [id number]) demonstrating an affect that was flat and anxious. Neto demonstrating mood that is anxious. Neto had an appearance that was disheveled. Neto denies suicidal ideation. Neto denies suicide plan. Neto denies homicidal ideation. Neto denies hallucinations.    Neto's  height is 6' 2" (1.88 m) and weight is 79.7 kg (175 lb 12.8 oz). His oral temperature is 97.2 °F (36.2 °C). His blood pressure is 113/80 and his pulse is 88. His respiration is 20 and oxygen saturation is 95%.     Neto's last BM was noted on: _______      Intervention:    Encourage Neto to perform self-hygiene, grooming, and changing of clothing. Monitor Neto's behavior and program compliance. Monitor Neto for suicidal ideation, homicidal ideation, sleep disturbance, and hallucinations. Encourage Neto to eat all portions of meals and assess for meal preferences. Monitor Neto for intake and output to ensure hydration. Notify the Physician/Physician Assistant/Advance Practice Registered Nurse (MD/PA/APRN) for any medication refusal and any change in patient condition.      Response:    Neto verbalizes understand of unit process and procedures. Neto reported medications ______.      Plan:     Continue to monitor per MD/PA/APRN orders; maintain patient safety.   "

## 2024-01-10 NOTE — GROUP NOTE
Group Psychotherapy       Group Focus: Communication Skills      Number of patients in attendance: Patient was able to discuss examples of bad communication and methods to improve communication in their own lives    Group Start Time: 1630  Group End Time:  1700  Groups Date: 1/10/2024  Group Topic:  Behavioral Health  Group Department: [facility]  Group Facilitators:  Linh Salvador RN  _____________________________________________________________________    Patient Name: Neto Ellison  MRN: [id number]  Patient Class: IP- Psych   Admission Date\Time: 1/7/2024 11:41 PM  Hospital Length of Stay: 3  Primary Care Provider: Sheridan Zelaya MD     Referred by: Acute Psychiatry Unit Treatment Team     Target symptoms: Depression     Patient's response to treatment: Active Listening, Self-disclosure, and Frequent Questions     Progress toward goals: Progressing well        Plan: Continue treatment on APU

## 2024-01-10 NOTE — CARE UPDATE
Treatment Team    Pt seem for treatment team today with interdisciplinary team.  Pt is Cooperative with Tx team. Pt denies symptoms at this time. MD did not change pt meds at this time. Treatment teams goals Not met at this time. Pt DC plan is home. DC date scheduled for 1/12/24.

## 2024-01-11 PROCEDURE — 11400000 HC PSYCH PRIVATE ROOM

## 2024-01-11 PROCEDURE — 25000003 PHARM REV CODE 250: Performed by: PSYCHIATRY & NEUROLOGY

## 2024-01-11 RX ORDER — OLANZAPINE 10 MG/1
10 TABLET, ORALLY DISINTEGRATING ORAL NIGHTLY
Qty: 30 TABLET | Refills: 0 | Status: SHIPPED | OUTPATIENT
Start: 2024-01-11 | End: 2024-02-10

## 2024-01-11 RX ADMIN — TRAZODONE HYDROCHLORIDE 100 MG: 100 TABLET ORAL at 08:01

## 2024-01-11 RX ADMIN — HYDROXYZINE HYDROCHLORIDE 50 MG: 50 TABLET, FILM COATED ORAL at 04:01

## 2024-01-11 NOTE — GROUP NOTE
Group Psychotherapy       Group Focus: Relationship Dynamics   Group topic: Healthy vs. Unhealthy relationships. Patients were given the opportunity to explore different aspects within a healthy and unhealthy relationship. Patients engaged in active discussion to help increase insight into topic at hand.        Number of patients in attendance: 4    Group Start Time: 1045  Group End Time:  1130  Groups Date: 1/11/2024  Group Topic:  Behavioral Health  Group Department: [facility] - Behavioral [facility] Unit  Group Facilitators:  Sandrine Acevedo MSW  _____________________________________________________________________    Patient Name: Neto Ellison  MRN: [id number]  Patient Class: IP- Psych   Admission Date\Time: 1/7/2024 11:41 PM  Hospital Length of Stay: 4  Primary Care Provider: Sheridan Zelaya MD     Referred by: Acute Psychiatry Unit Treatment Team     Target symptoms: Psychosis     Patient's response to treatment: Not Participating     Progress toward goals: not progressing; Pt was not present in group session; alternate provided.      Interval History:      Diagnosis:     Plan: Continue treatment on APU

## 2024-01-11 NOTE — NURSING
"PRN Administration Note:    Behavior:    Patient (Neto Ellison is a 33 y.o. male, : 1990, MRN: [id number])     Allergies: Patient has no known allergies.    Neto's  height is 6' 2" (1.88 m) and weight is 79.7 kg (175 lb 12.8 oz). His temperature is 98.4 °F (36.9 °C). His blood pressure is 152/102 (abnormal) and his pulse is 80. His respiration is 18 and oxygen saturation is 99%.     Reason for PRN Administration: anxiety.    Intervention:    Administered Atarax 50 mg PO PRN per physician order to Neto       Response:    Neto tolerated administration well.      Plan:     Continue to monitor per MD/PA/APRN orders; and reevaluate medication effectiveness within 30 minutes.    "

## 2024-01-11 NOTE — NURSING
"PRN Medication Follow-up Note:    Behavior:    Patient (eNto Ellison is a 33 y.o. male, : 1990, MRN: [id number])     Allergies: Patient has no known allergies.    Elijahs  height is 6' 2" (1.88 m) and weight is 79.7 kg (175 lb 12.8 oz). His temperature is 98.4 °F (36.9 °C). His blood pressure is 152/102 (abnormal) and his pulse is 80. His respiration is 18 and oxygen saturation is 99%.     Administered Atarax 50 mg PO PRN per physician order to Neto       Intervention:    Intervention to Neto's response: decrease in anxiety.       Response:    Neto's response: decrease in anxiety.      Plan:     Continue to monitor per MD/PA/APRN orders; and reevaluate medication effectiveness within 30 minutes.    "

## 2024-01-11 NOTE — PLAN OF CARE
Neto met interdisciplinary treatment goal of Improved Sleep.  CTRS Discharge Recommendations:  Encouraged Pt. to actively utilize available community resources to increase leisure involvement to decrease signs and symptoms of illness.  Encouraged Pt. to utilize coping skills on a regular basis to reduce the risk of decomposition and re-hospitalization.

## 2024-01-11 NOTE — NURSING
Patient assessment completed as charted. Patient did not take night meds he refused to get out of bed to get his medications. Will continue to monitor patient

## 2024-01-11 NOTE — PROGRESS NOTES
"1/11/2024  Neto Ellison   1990   [id number]        Psychiatry Progress Note     Chief Complaint: "All right"    SUBJECTIVE:   Neto Ellison is a 33 y.o. male placed under a PEC at [facility] for paranoid delusions that Dr. Rose implanted a listening device in his head.  He apparently called the FBI who then called the local 's office about this.    Patient shows a much better affect and mood today. He states that he is feeling better. Staff have not reported any behavioral issues since starting his medication. He was calm and cooperative during this exam. 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  Will continue this medication and encourage compliance. Will plan for discharge tomorrow.       UDS: (+)amphetamine, cannabis  Blood alcohol: <10    Current Medications:   Scheduled Meds:    OLANZapine zydis  10 mg Oral QHS      PRN Meds: acetaminophen, aluminum-magnesium hydroxide-simethicone, diphenhydrAMINE, haloperidoL **AND** [DISCONTINUED] diphenhydrAMINE **AND** LORazepam **AND** haloperidol lactate **AND** diphenhydrAMINE **AND** [DISCONTINUED] lorazepam, haloperidol lactate, haloperidoL, hydrOXYzine HCL, lorazepam, magnesium hydroxide 400 mg/5 ml, traZODone   Psychotherapeutics (From admission, onward)      Start     Stop Route Frequency Ordered    01/09/24 2100  OLANZapine zydis disintegrating tablet 10 mg         -- Oral Nightly 01/09/24 0914    01/08/24 0032  LORazepam tablet 2 mg  (Med - Acute  Behavioral Management)        See Hyperspace for full Linked Orders Report.    -- Oral Every 4 hours PRN 01/07/24 2348    01/08/24 0032  haloperidol lactate injection 10 mg  (Med - Acute  Behavioral Management)        See Hyperspace for full Linked Orders Report.    -- IM Every 4 hours PRN 01/07/24 2348    01/08/24 0032  haloperidoL tablet 10 mg  (Med - Acute  Behavioral Management)        See Hyperspace for full Linked Orders Report.    -- Oral Every 4 hours PRN " "01/07/24 2348    01/07/24 2347  haloperidol lactate injection 10 mg         -- IM Every 6 hours PRN 01/07/24 2348    01/07/24 2347  haloperidoL tablet 10 mg         -- Oral Every 4 hours PRN 01/07/24 2348    01/07/24 2347  LORazepam injection 2 mg         -- IM Every 6 hours PRN 01/07/24 2348    01/07/24 2347  traZODone tablet 100 mg         -- Oral Nightly PRN 01/07/24 2348            Allergies:   Review of patient's allergies indicates:  No Known Allergies     OBJECTIVE:   Vitals   Vitals:    01/11/24 0701   BP: (!) 152/102   Pulse: 80   Resp: 18   Temp: 98.4 °F (36.9 °C)        Labs/Imaging/Studies:   No results found for this or any previous visit (from the past 36 hour(s)).       Medical Review Of Systems:  Constitutional: negative  Respiratory: negative  Cardiovascular: negative  Gastrointestinal: negative  Genitourinary:negative  Musculoskeletal:negative  Neurological: negative       Psychiatric Mental Status Exam:  General Appearance: appears stated age, well-developed, well-nourished  Arousal: alert  Behavior: cooperative, less irritable  Movements and Motor Activity: no abnormal involuntary movements noted  Orientation: oriented to person, place, time, and situation  Speech: normal rate, normal rhythm, normal volume, normal tone  Mood: "All right"  Affect: constricted  Thought Process: linear  Associations: intact  Thought Content and Perceptions: much less paranoid today, no suicidal ideation, no homicidal ideation  Recent and Remote Memory: recent memory intact, remote memory intact; per interview/observation with patient  Attention and Concentration: intact, attentive to conversation; per interview/observation with patient  Fund of Knowledge: intact, aware of current events, vocabulary appropriate; based on history, vocabulary, fund of knowledge, syntax, grammar, and content  Insight: questionable; based on understanding of severity of illness and HPI  Judgment: questionable; based on patient's behavior " and HPI      ASSESSMENT/PLAN:   Problems Addressed/Diagnoses:  Unspecified Psychotic Disorder (F29)  Amphetamine use disorder (F15.20)  Cannabis use disorder (F12.20)    No past medical history on file.     Plan:  Psychosis, acute  -Continue Zyprexa zydis     Amphetamine use, chronic with acute exacerbation  -Group/Individual psychotherapy     Cannabis use, chronic with acute exacerbation  -Group/Individual psychotherapy    Expected Disposition Plan: Home        Rahul Franco, [id number]

## 2024-01-12 VITALS
OXYGEN SATURATION: 98 % | HEIGHT: 74 IN | WEIGHT: 175.81 LBS | BODY MASS INDEX: 22.56 KG/M2 | TEMPERATURE: 98 F | SYSTOLIC BLOOD PRESSURE: 132 MMHG | DIASTOLIC BLOOD PRESSURE: 89 MMHG | HEART RATE: 82 BPM | RESPIRATION RATE: 20 BRPM

## 2024-01-12 NOTE — NURSING
Discharge Note:    Neto Ellison is a 33 y.o. male, : 1990, MRN: [id number], admitted on 2024 for Cj Cintron MD with a diagnosis of Psychosis [F29].    Patient discharged on 2024 per physician orders in stable condition. Patient denied suicidal ideation, homicidal ideation, or hallucinations. Patient was discharged with valuables, personal belongings, prescriptions, discharge instructions, and an educational handout explaining the diagnosis and prescribed medications. Patient verbalized understanding of the discharge instructions and importance of follow-up visits. Patient was escorted out of the facility by [facility] and placed into a private vehicle to be transported to home.     Patient discharged on the following medications:     Medication List        START taking these medications      OLANZapine zydis 10 MG Tbdl  Commonly known as: ZyPREXA  Take 1 tablet (10 mg total) by mouth every evening.               Where to Get Your Medications        You can get these medications from any pharmacy    Bring a paper prescription for each of these medications  OLANZapine zydis 10 MG Tbdl

## 2024-01-12 NOTE — PLAN OF CARE
Problem: Adult Inpatient Plan of Care  Goal: Plan of Care Review  Outcome: Met  Goal: Patient-Specific Goal (Individualized)  Outcome: Met  Goal: Absence of Hospital-Acquired Illness or Injury  Outcome: Met  Goal: Optimal Comfort and Wellbeing  Outcome: Met  Goal: Readiness for Transition of Care  Outcome: Met     Problem: Violence Risk or Actual  Goal: Anger and Impulse Control  1/11/2024 2240 by Grace Henson LPN  Outcome: Met  1/11/2024 2106 by Grace Henson LPN  Outcome: Ongoing, Progressing     Problem: Behavior Regulation Impairment (Psychotic Signs/Symptoms)  Goal: Improved Behavioral Control (Psychotic Signs/Symptoms)  1/11/2024 2240 by Grace Henson LPN  Outcome: Met  1/11/2024 2106 by Grace Henson LPN  Outcome: Ongoing, Progressing     Problem: Cognitive Impairment (Psychotic Signs/Symptoms)  Goal: Optimal Cognitive Function (Psychotic Signs/Symptoms)  1/11/2024 2240 by Grace Henson LPN  Outcome: Met  1/11/2024 2106 by Grace Henson LPN  Outcome: Ongoing, Progressing     Problem: Decreased Participation and Engagement (Psychotic Signs/Symptoms)  Goal: Increased Participation and Engagement (Psychotic Signs/Symptoms)  1/11/2024 2240 by Grace Henson LPN  Outcome: Met  1/11/2024 2106 by Grace Henson LPN  Outcome: Ongoing, Progressing     Problem: Mood Impairment (Psychotic Signs/Symptoms)  Goal: Improved Mood Symptoms (Psychotic Signs/Symptoms)  1/11/2024 2240 by Grace Henson LPN  Outcome: Met  1/11/2024 2106 by Grace Henson LPN  Outcome: Ongoing, Progressing     Problem: Psychomotor Impairment (Psychotic Signs/Symptoms)  Goal: Improved Psychomotor Symptoms (Psychotic Signs/Symptoms)  1/11/2024 2240 by Grace Henson LPN  Outcome: Met  1/11/2024 2106 by Grace Henson LPN  Outcome: Ongoing, Progressing     Problem: Sensory Perception Impairment (Psychotic Signs/Symptoms)  Goal: Decreased Sensory Symptoms (Psychotic Signs/Symptoms)  1/11/2024 2240 by Grace Henson  LPN  Outcome: Met  1/11/2024 2106 by Grace Henson LPN  Outcome: Ongoing, Progressing     Problem: Sleep Disturbance (Psychotic Signs/Symptoms)  Goal: Improved Sleep (Psychotic Signs/Symptoms)  1/11/2024 2240 by Grace Henson LPN  Outcome: Met  1/11/2024 2106 by Grace Henson LPN  Outcome: Ongoing, Progressing     Problem: Social, Occupational or Functional Impairment (Psychotic Signs/Symptoms)  Goal: Enhanced Social, Occupational or Functional Skills (Psychotic Signs/Symptoms)  1/11/2024 2240 by Grace Henson LPN  Outcome: Met  1/11/2024 2106 by Grace Henson LPN  Outcome: Ongoing, Progressing

## 2024-01-12 NOTE — NURSING
Nursing assessment completed as charted. Patient complained of insomnia and was given PRN trazadone. Will continue to monitor.

## 2024-01-12 NOTE — CARE UPDATE
Transportation has been set up via \[facility]\"". Patient is going to [address] [place] [address]

## 2024-01-20 NOTE — DISCHARGE SUMMARY
"DISCHARGE SUMMARY  PSYCHIATRY      Admit Date: 1/7/2024 11:41 PM    Discharge Date:  1/12/2024    SITE:   [facility]    Discharge Attending Physician: Cj Cintron M.D.    Chief Complaint:  "They just sent me here."     History of Present Illness On Admit:   Neto Ellison is a 33 y.o. male [facility] that Dr. Rose implanted a listening device in his head.  He apparently called the FBI who then called the local 's office about this.     Patient states that he is unsure why he was sent here.  He states that he went to the ED because he wanted an X-ray of his head.  He reports that he's "had a medical detector go off near his head" and wanted to get it checked out.     He does not remember mentioning Dr. Rose's name in the ED.  He is overly focused on being sent "to another one of these places" and on discharge.  He does appear more clear and less paranoid than the reports from prior to his hospitalization.  He does not want to start any medication.  He is not aggressive or threatening currently but there is the potential for this to change given his focus on discharge.  Will admit to inpatient unit for behavior monitoring and potential medication management.     UDS: (+)amphetamine, cannabis  Blood alcohol: <10      Admit Mental Status Exam:  General Appearance: appears stated age, disheveled  Arousal: alert  Behavior: somewhat cooperative  Movements and Motor Activity: no abnormal involuntary movements noted  Orientation: oriented to person, place, time, and situation  Speech: normal rate, normal rhythm, normal volume, normal tone, frequent throat clearing  Mood: Anxious  Affect: Anxious  Thought Process: linear  Associations: intact  Thought Content and Perceptions: (+)recent paranoid delusions, denies suicidal ideation, no homicidal ideation  Recent and Remote Memory: recent memory intact, remote memory " "intact; per interview/observation with patient  Attention and Concentration: intact, attentive to conversation; per interview/observation with patient  Fund of Knowledge: intact, aware of current events, vocabulary appropriate; based on history, vocabulary, fund of knowledge, syntax, grammar, and content  Insight: questionable; based on understanding of severity of illness and HPI  Judgment: questionable; based on patient's behavior and HPI       Diagnoses:  PRINCIPAL PROBLEM:  Psychotic disorder      PROBLEM LIST    Psychotic disorder    Methamphetamine addiction    Cannabis dependence, continuous        Hospital Course:   Patient was admitted to [facility].    1/9/24  Patient continues to show severe signs of paranoia and delusions. He continues to think that there is nothing wrong with him. He stated that every time he comes to a place like this the doctors do not help him, however when I asked what I could do specifically to help him he stated that he did not want my help. He continues to believe that there is some sort of bug implanted in his ear and that he is being controlled like a puppet by "they". He was unable to tell me who "they" are. When asked why they chose him to do this to he stated that it was a bet. He continues to refuse medication. Staff report that he has not had any acute behavioral issues so I will not issue a forced protocol at this time. However patient would benefit from an antipsychotic so I will start Zyprexa Zydis 10 mg HS.        1/10/24  Patient did take Zyprexa last night.  Staff reports that he had generally been irritable and refusing care.  However, he is calmer this morning.  Just arising from sleep.  Will continue this medication and encourage compliance.         1/11/24  Patient shows a much better affect and mood today. He states that he is feeling better. Staff have not reported any behavioral issues since starting his medication. He was calm and cooperative during this exam. 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  Will continue this medication and encourage compliance. Will plan for discharge tomorrow.       Current Medications:   Scheduled Meds:        DISCHARGE EXAMINATION    VITALS   Vitals:    01/10/24 1600 01/10/24 1900 01/11/24 0701 01/12/24 0701   BP: 134/87 (!) 137/90 (!) 152/102 132/89   BP Location:  Left arm  Right arm   Patient Position:    Sitting   Pulse: 108 88 80 82   Resp: 18 18 18 20   Temp: 97.4 °F (36.3 °C) 97.5 °F (36.4 °C) 98.4 °F (36.9 °C) 97.8 °F (36.6 °C)   TempSrc:  Oral  Oral   SpO2: 98% 99% 99% 98%   Weight:       Height:             Discharge Mental Status Exam:  General Appearance: appears stated age, well-developed, well-nourished  Arousal: alert  Behavior: cooperative, less irritable  Movements and Motor Activity: no abnormal involuntary movements noted  Orientation: oriented to person, place, time, and situation  Speech: normal rate, normal rhythm, normal volume, normal tone  Mood: "All right"  Affect: constricted  Thought Process: linear  Associations: intact  Thought Content and Perceptions: paranoia improved, no suicidal ideation, no homicidal ideation  Recent and Remote Memory: recent memory intact, remote memory intact; per interview/observation with patient  Attention and Concentration: intact, attentive to conversation; per interview/observation with patient  Fund of Knowledge: intact, aware of current events, vocabulary appropriate; based on history, vocabulary, fund of knowledge, syntax, grammar, and content  Insight: questionable; based on understanding of severity of illness and HPI  Judgment: questionable; based on patient's behavior and HPI      Discharge Condition:  Stable    Prognosis:  Fair    Justification for multiple antipsychotics:  N/a    Disposition:  discharged to home    Follow-up:     Follow-up Information       [facility] Follow up.    Why: 1.18.2024 @1PM  Please " arrive 15 mins before appt to complete paperwork  Pt is requried to bring insurance card, id and dc paperwork  Contact information:  [place] [id number]    [phone]                           Medication Regimen:  No current facility-administered medications for this encounter.    Current Outpatient Medications:     OLANZapine zydis (ZYPREXA) 10 MG TbDL, Take 1 tablet (10 mg total) by mouth every evening., Disp: 30 tablet, Rfl: 0      Patient Instructions:   Continue medication regimen as prescribed.    Disposition plan per  - see  notes for details.    Patient instructed to call 911 or present to emergency department if any of the following complications develop status post discharge: suicidality, homicidality, or grave disability.     Total time spent discharging patient: <30 minutes      Cj Cintron M.D.